# Patient Record
Sex: FEMALE | Race: WHITE | NOT HISPANIC OR LATINO
[De-identification: names, ages, dates, MRNs, and addresses within clinical notes are randomized per-mention and may not be internally consistent; named-entity substitution may affect disease eponyms.]

---

## 2018-08-29 ENCOUNTER — RECORDS - HEALTHEAST (OUTPATIENT)
Dept: ADMINISTRATIVE | Facility: OTHER | Age: 48
End: 2018-08-29

## 2018-08-29 ENCOUNTER — AMBULATORY - HEALTHEAST (OUTPATIENT)
Dept: SURGERY | Facility: CLINIC | Age: 48
End: 2018-08-29

## 2018-08-29 DIAGNOSIS — N60.91 ATYPICAL DUCTAL HYPERPLASIA OF RIGHT BREAST: ICD-10-CM

## 2018-09-04 ENCOUNTER — AMBULATORY - HEALTHEAST (OUTPATIENT)
Dept: SURGERY | Facility: CLINIC | Age: 48
End: 2018-09-04

## 2018-09-05 ENCOUNTER — HOSPITAL ENCOUNTER (OUTPATIENT)
Dept: SURGERY | Facility: CLINIC | Age: 48
Discharge: HOME | End: 2018-09-05
Attending: SURGERY

## 2018-09-05 DIAGNOSIS — N64.52 NIPPLE DISCHARGE: ICD-10-CM

## 2018-09-05 DIAGNOSIS — N60.91 ATYPICAL DUCTAL HYPERPLASIA OF RIGHT BREAST: ICD-10-CM

## 2018-09-05 ASSESSMENT — MIFFLIN-ST. JEOR: SCORE: 1139.58

## 2018-09-09 ENCOUNTER — HOSPITAL ENCOUNTER (OUTPATIENT)
Dept: MRI IMAGING | Facility: HOSPITAL | Age: 48
Discharge: HOME | End: 2018-09-09

## 2018-09-09 ENCOUNTER — COMMUNICATION - HEALTHEAST (OUTPATIENT)
Dept: TELEHEALTH | Facility: CLINIC | Age: 48
End: 2018-09-09

## 2018-09-09 DIAGNOSIS — N64.52 NIPPLE DISCHARGE: ICD-10-CM

## 2018-09-11 ENCOUNTER — RECORDS - HEALTHEAST (OUTPATIENT)
Dept: RADIOLOGY | Facility: CLINIC | Age: 48
End: 2018-09-11

## 2018-09-13 ENCOUNTER — COMMUNICATION - HEALTHEAST (OUTPATIENT)
Dept: SURGERY | Facility: CLINIC | Age: 48
End: 2018-09-13

## 2018-09-17 ENCOUNTER — COMMUNICATION - HEALTHEAST (OUTPATIENT)
Dept: SURGERY | Facility: CLINIC | Age: 48
End: 2018-09-17

## 2018-09-19 ENCOUNTER — HOSPITAL ENCOUNTER (OUTPATIENT)
Dept: SURGERY | Facility: CLINIC | Age: 48
Discharge: HOME | End: 2018-09-19
Attending: SURGERY

## 2018-09-19 DIAGNOSIS — N60.91 ATYPICAL DUCTAL HYPERPLASIA OF RIGHT BREAST: ICD-10-CM

## 2018-09-19 ASSESSMENT — MIFFLIN-ST. JEOR: SCORE: 1132.75

## 2018-09-24 ENCOUNTER — HOSPITAL ENCOUNTER (OUTPATIENT)
Dept: MAMMOGRAPHY | Facility: CLINIC | Age: 48
Discharge: HOME | End: 2018-09-24
Attending: SURGERY

## 2018-09-24 ENCOUNTER — ANESTHESIA - HEALTHEAST (OUTPATIENT)
Dept: SURGERY | Facility: AMBULATORY SURGERY CENTER | Age: 48
End: 2018-09-24

## 2018-09-24 ENCOUNTER — HOSPITAL ENCOUNTER (OUTPATIENT)
Dept: MAMMOGRAPHY | Facility: CLINIC | Age: 48
Discharge: HOME | End: 2018-09-24
Attending: SURGERY | Admitting: RADIOLOGY

## 2018-09-24 ENCOUNTER — SURGERY - HEALTHEAST (OUTPATIENT)
Dept: SURGERY | Facility: AMBULATORY SURGERY CENTER | Age: 48
End: 2018-09-24

## 2018-09-24 DIAGNOSIS — N64.89 BREAST ASYMMETRY: ICD-10-CM

## 2018-09-24 DIAGNOSIS — N60.91 ATYPICAL DUCTAL HYPERPLASIA OF RIGHT BREAST: ICD-10-CM

## 2018-09-24 ASSESSMENT — MIFFLIN-ST. JEOR: SCORE: 1131.64

## 2018-09-25 ENCOUNTER — COMMUNICATION - HEALTHEAST (OUTPATIENT)
Dept: SURGERY | Facility: CLINIC | Age: 48
End: 2018-09-25

## 2018-09-26 ENCOUNTER — COMMUNICATION - HEALTHEAST (OUTPATIENT)
Dept: SURGERY | Facility: CLINIC | Age: 48
End: 2018-09-26

## 2018-09-27 ENCOUNTER — COMMUNICATION - HEALTHEAST (OUTPATIENT)
Dept: SURGERY | Facility: CLINIC | Age: 48
End: 2018-09-27

## 2018-10-11 ENCOUNTER — RECORDS - HEALTHEAST (OUTPATIENT)
Dept: ADMINISTRATIVE | Facility: OTHER | Age: 48
End: 2018-10-11

## 2018-10-12 ENCOUNTER — COMMUNICATION - HEALTHEAST (OUTPATIENT)
Dept: SURGERY | Facility: CLINIC | Age: 48
End: 2018-10-12

## 2018-10-12 ENCOUNTER — AMBULATORY - HEALTHEAST (OUTPATIENT)
Dept: SURGERY | Facility: CLINIC | Age: 48
End: 2018-10-12

## 2018-10-12 DIAGNOSIS — D05.10 DCIS (DUCTAL CARCINOMA IN SITU): ICD-10-CM

## 2018-10-18 ENCOUNTER — COMMUNICATION - HEALTHEAST (OUTPATIENT)
Dept: SURGERY | Facility: CLINIC | Age: 48
End: 2018-10-18

## 2018-10-18 ASSESSMENT — MIFFLIN-ST. JEOR: SCORE: 1139.58

## 2018-10-22 ENCOUNTER — HOSPITAL ENCOUNTER (OUTPATIENT)
Dept: NUCLEAR MEDICINE | Facility: HOSPITAL | Age: 48
Discharge: HOME | End: 2018-10-22
Attending: SURGERY | Admitting: SURGERY

## 2018-10-22 ENCOUNTER — SURGERY - HEALTHEAST (OUTPATIENT)
Dept: SURGERY | Facility: HOSPITAL | Age: 48
End: 2018-10-22

## 2018-10-22 ENCOUNTER — HOSPITAL ENCOUNTER (OUTPATIENT)
Dept: NUCLEAR MEDICINE | Facility: HOSPITAL | Age: 48
Discharge: HOME | End: 2018-10-22
Attending: SURGERY

## 2018-10-22 ENCOUNTER — ANESTHESIA - HEALTHEAST (OUTPATIENT)
Dept: SURGERY | Facility: HOSPITAL | Age: 48
End: 2018-10-22

## 2018-10-22 DIAGNOSIS — D05.10 DCIS (DUCTAL CARCINOMA IN SITU): ICD-10-CM

## 2018-10-22 ASSESSMENT — MIFFLIN-ST. JEOR: SCORE: 1166.14

## 2018-10-23 ENCOUNTER — COMMUNICATION - HEALTHEAST (OUTPATIENT)
Dept: SURGERY | Facility: CLINIC | Age: 48
End: 2018-10-23

## 2018-10-24 ENCOUNTER — COMMUNICATION - HEALTHEAST (OUTPATIENT)
Dept: SURGERY | Facility: CLINIC | Age: 48
End: 2018-10-24

## 2018-10-25 ENCOUNTER — AMBULATORY - HEALTHEAST (OUTPATIENT)
Dept: SURGERY | Facility: CLINIC | Age: 48
End: 2018-10-25

## 2018-10-25 DIAGNOSIS — Z17.1 MALIGNANT NEOPLASM OF RIGHT BREAST IN FEMALE, ESTROGEN RECEPTOR NEGATIVE, UNSPECIFIED SITE OF BREAST (H): ICD-10-CM

## 2018-10-25 DIAGNOSIS — C50.911 MALIGNANT NEOPLASM OF RIGHT BREAST IN FEMALE, ESTROGEN RECEPTOR NEGATIVE, UNSPECIFIED SITE OF BREAST (H): ICD-10-CM

## 2018-11-30 ENCOUNTER — COMMUNICATION - HEALTHEAST (OUTPATIENT)
Dept: ADMINISTRATIVE | Facility: HOSPITAL | Age: 48
End: 2018-11-30

## 2018-12-13 ENCOUNTER — COMMUNICATION - HEALTHEAST (OUTPATIENT)
Dept: ONCOLOGY | Facility: HOSPITAL | Age: 48
End: 2018-12-13

## 2018-12-13 ENCOUNTER — RECORDS - HEALTHEAST (OUTPATIENT)
Dept: ADMINISTRATIVE | Facility: OTHER | Age: 48
End: 2018-12-13

## 2018-12-13 ENCOUNTER — OFFICE VISIT - HEALTHEAST (OUTPATIENT)
Dept: ONCOLOGY | Facility: CLINIC | Age: 48
End: 2018-12-13

## 2018-12-13 DIAGNOSIS — Z17.1 MALIGNANT NEOPLASM OF RIGHT BREAST IN FEMALE, ESTROGEN RECEPTOR NEGATIVE, UNSPECIFIED SITE OF BREAST (H): ICD-10-CM

## 2018-12-13 DIAGNOSIS — C50.911 MALIGNANT NEOPLASM OF RIGHT BREAST IN FEMALE, ESTROGEN RECEPTOR NEGATIVE, UNSPECIFIED SITE OF BREAST (H): ICD-10-CM

## 2018-12-13 DIAGNOSIS — Z80.0 FAMILY HISTORY OF COLON CANCER: ICD-10-CM

## 2018-12-13 DIAGNOSIS — Z71.83 ENCOUNTER FOR NONPROCREATIVE GENETIC COUNSELING: ICD-10-CM

## 2018-12-13 DIAGNOSIS — Z13.79 GENETIC TESTING: ICD-10-CM

## 2019-01-04 ENCOUNTER — COMMUNICATION - HEALTHEAST (OUTPATIENT)
Dept: ONCOLOGY | Facility: CLINIC | Age: 49
End: 2019-01-04

## 2019-01-09 ENCOUNTER — COMMUNICATION - HEALTHEAST (OUTPATIENT)
Dept: ONCOLOGY | Facility: HOSPITAL | Age: 49
End: 2019-01-09

## 2019-02-05 ENCOUNTER — AMBULATORY - HEALTHEAST (OUTPATIENT)
Dept: SURGERY | Facility: CLINIC | Age: 49
End: 2019-02-05

## 2021-06-02 VITALS — BODY MASS INDEX: 19.16 KG/M2 | HEIGHT: 65 IN | WEIGHT: 115 LBS

## 2021-06-02 VITALS — WEIGHT: 115 LBS | BODY MASS INDEX: 19.63 KG/M2 | HEIGHT: 64 IN

## 2021-06-02 VITALS — WEIGHT: 115 LBS | BODY MASS INDEX: 19.43 KG/M2

## 2021-06-02 VITALS — BODY MASS INDEX: 19.43 KG/M2 | WEIGHT: 115 LBS

## 2021-06-02 VITALS — HEIGHT: 64 IN | WEIGHT: 122 LBS | BODY MASS INDEX: 20.83 KG/M2

## 2021-06-20 NOTE — ANESTHESIA PREPROCEDURE EVALUATION
Anesthesia Evaluation      Patient summary reviewed   No history of anesthetic complications     Airway   Mallampati: II  Neck ROM: full   Pulmonary - negative ROS and normal exam    breath sounds clear to auscultation                         Cardiovascular - negative ROS and normal exam   Neuro/Psych - negative ROS     Endo/Other - negative ROS      GI/Hepatic/Renal - negative ROS      Other findings: Basal cell cancer      Dental - normal exam   (+) caps                       Anesthesia Plan  Planned anesthetic: MAC  FiO2 less than 30%  Propofol ggt  Decadron/zofran  ASA 1   Induction: intravenous   Anesthetic plan and risks discussed with: patient  Anesthesia plan special considerations: antiemetics,   Post-op plan: routine recovery        Results for orders placed or performed during the hospital encounter of 09/24/18   POCT Pregnancy (Beta-hCG, Qual), Urine (Point of Care) on DOS   Result Value Ref Range    POC Preg, Urine Negative Negative    POCt Kit Lot Number [id number]     POCT Kit Expiration Date 202001    Hemoglobin POCT (not required for cataract patients)   Result Value Ref Range    Hgb 11.6 7.0 g/dL

## 2021-06-20 NOTE — ANESTHESIA CARE TRANSFER NOTE
Last vitals:   Vitals:    09/24/18 1358   BP: (P) 118/57   Pulse: (P) 75   Resp: (P) 16   Temp: (P) 36.8  C (98.2  F)   SpO2: (P) 99%     Patient's level of consciousness is drowsy  Spontaneous respirations: yes  Maintains airway independently: yes  Dentition unchanged: yes  Oropharynx: oropharynx clear of all foreign objects    QCDR Measures:  ASA# 20 - Surgical Safety Checklist: WHO surgical safety checklist completed prior to induction  PQRS# 430 - Adult PONV Prevention: 4558F - Pt received => 2 anti-emetic agents (different classes) preop & intraop  ASA# 8 - Peds PONV Prevention: NA - Not pediatric patient, not GA or 2 or more risk factors NOT present  PQRS# 424 - Zuri-op Temp Management: 4559F - At least one body temp DOCUMENTED => 35.5C or 95.9F within required timeframe  PQRS# 426 - PACU Transfer Protocol: - Transfer of care checklist used  ASA# 14 - Acute Post-op Pain: ASA14B - Patient did NOT experience pain >= 7 out of 10

## 2021-06-20 NOTE — PROGRESS NOTES
HISTORY:  This is a 48 y.o. woman who I'm asked to see by aSgar Dial MD for evaluation of right breast atypical ductal hyperplasia.  This was diagnosed when the patient was living in [place] last month.  All of her studies were performed there.  This diagnosis actually came about when she complained of nipple discharge.  The discharge has been present for about 2 months.  It is clear and spontaneous.  It can be expressed with manipulation.  This led to mammography and ultrasound.  She underwent a needle biopsy which shows atypical ductal hyperplasia and a fibroadenoma.  She has no palpable mass that she can feel.  She even had a galactogram and cytology, which failed to show any abnormalities.    PAST MEDICAL HISTORY:  Past Medical History:   Diagnosis Date     Breast ductal hyperplasia, atypical        PAST SURGICAL HISTORY:  Past Surgical History:   Procedure Laterality Date     APPENDECTOMY       BASAL CELL CARCINOMA EXCISION      back and forehead     SINUS SURGERY      removed wisdom tooth remant and solved problem     MEDICATIONS  No current outpatient prescriptions on file.    ALLERGIES:  No Known Allergies    SOCIAL HISTORY:   reports that she has never smoked. She has never used smokeless tobacco.  Works for a pharmaceutical company.    FAMILY HISTORY:  She has no family history of breast cancer.    ROS:  General: No complaints or constitutional symptoms  Skin: No complaints or symptoms   Hematologic/Lymphatic: No symptoms or complaints  Psychiatric: No symptoms or complaints  Endocrine: No excessive fatigue, no hypermetabolic symptoms reported  Respiratory: No cough, shortness of breath, or wheezing  Cardiovascular: No chest pain or dyspnea on exertion  Breast: No palpable lesions.  There is clear right nipple discharge  Gastrointestinal: No abdominal pain, nausea, diarrhea, or constipation  Musculoskeletal: No recent injuries reported  Neurological: No focal neurologic defects reported.       PHYSICAL EXAM  /60 (Patient Site: Left Arm, Patient Position: Sitting, Cuff Size: Adult Regular)  Pulse 82  Wt 115 lb (52.2 kg)  SpO2 99%  Breastfeeding? No  BMI 19.43 kg/m2  Body mass index is 19.43 kg/(m^2).  General: Alert, cooperative, appears stated age   Skin: Skin color, texture, turgor normal, no rashes or lesions   Lymphatic: No obvious adenopathy, no swelling   Eyes: No scleral icterus, pupils equal  HENT: No traumatic injury to the head or face, no gross abnormalities  Lungs: Normal respiratory effort, breath sounds equal bilaterally  Heart: Regular rate and rhythm  Breasts: No skin dimpling, nipple discharge, palpable mass or adenopathy.  There is clear nipple discharge.  Abdomen: Soft, non-distended and non-tender to palpation  Neurologic: Grossly intact    IMAGING:   Her mammogram showed disseminated groups of microcalcifications.  Her workup also demonstrated duct ectasia and a small cystic lesion.    PATHOLOGY:  Right breast 7:00 duct ectasia and atypical ductal hyperplasia, but the quality of DCIS was not reached.  Right breast 10 o'clock position fibroadenoma    IMPRESSION:   Right breast nipple discharge  Right breast duct ectasia and atypical ductal hyperplasia    PLAN:   Prior to the patient's move, she was informed that the next step of her workup should be MRI.  I think that would be reasonable to continue with to work up her nipple discharge to make sure there is not an occult mass.  Once the MRI is performed and any further results from that are complete, the patient will return to the clinic to discuss the results and plan future surgery.  She understands that further excision is indicated to rule out DCIS or invasive carcinoma.  This would be done under wire localization.  It was confirmed that clips were left in place in the prior biopsy sites.  In regard to the nipple discharge, a subareolar duct excision could also be performed.  Both of these are typically an outpatient  procedure under local MAC anesthetic.      We will await the MRI results and discuss future plans after that.    Jenny Galdamez, [facility]  [phone]

## 2021-06-20 NOTE — PROGRESS NOTES
HISTORY:  This is a 48 y.o. woman who represents to discuss MRI and potentially plan surgery.  She had imaging studies performed in [place] for right nipple discharge.  She had undergone 2 biopsies that demonstrated a fibroadenoma, atypical ductal hyperplasia, and duct ectasia.  She continues to have right nipple discharge and wants to have duct excision as well as the ADH excised.    PHYSICAL EXAM  Deferred    IMAGING:   MR BREAST WITHOUT AND WITH CONTRAST BILATERAL  09/09/2018 1:21 PM     INDICATION: High risk screening. Right nipple discharge. The patient has a history of atypical ductal hyperplasia and fibroadenoma which was diagnosed when she was living in [place] approximately one month ago.  TECHNIQUE: 3D high-resolution images, vascular subtraction images with 7 mL Gadavist and MIP images performed. The study was processed using a [employer] computer-aided detection system to optimize radiologist interpretation by generating multiplanar and   3D reconstructions, creating subtraction images from the dynamic contrast data, and computing tumor volumes and dimensions.  COMPARISON: We are attempting to obtain the patient's prior exams from [place]. So far, we have not been able to obtain them.     RIGHT BREAST: There is moderate background enhancement. There are innumerable circumscribed lesions which are high signal on the STIR images and low signal on T1-weighted images consistent with benign cysts. A marker clip from a prior biopsy is seen in   the posterior aspect of the upper outer quadrant in approximately the 11:00-11:30 position in zone 3. There appears to be a moderately dilated duct in the 9:30 position in the anterior tissues containing proteinaceous fluid. No definite intraductal   lesion is identified. There are no findings to suggest malignancy. The axilla is unremarkable. MIP images confirm these findings.     LEFT BREAST: There is moderate background enhancement. There are innumerable circumscribed  lesions which are high signal on the STIR images and low signal on T1-weighted images consistent with benign cysts. There are no findings to suggest malignancy.   The axilla is unremarkable. MIP images confirm these findings.     IMPRESSION:   1.  Mildly dilated duct in the right breast in approximately the 9:30 position. No definite intraductal lesion is identified.     2.  Extensive benign cysts in both breasts.     3.  Marker clip in the right breast. Correlation with the patient's biopsy history is recommended.     4.  The patient's prior exams have not been obtained so far. If they can be obtained, a comparison will be made and an addendum dictated.     RIGHT BREAST: ACR BI-RADS Category 2: Benign.  LEFT BREAST: ACR BI-RADS Category 2: Benign.    ADDENDUM:  Prior mammogram, breast ultrasound, and ductogram of the right breast from 8/7/2018 are now available for comparison. The images do not provide information that is helpful with the interpretation of the MRI. Multiple benign cysts are seen on the   ultrasound and circumscribed lesions in the breasts on mammography correlate with benign cysts seen on the MRI.    PATHOLOGY:  Right breast 7 o'clock duct ectasia and atypical ductal hyperplasia, but the quality of DCIS was not reached.  Right breast 10 o'clock position fibroadenoma    IMPRESSION:   Right nipple discharge  Duct ectasia and atypical ductal hyperplasia    PLAN:   The patient already understands that further excision is indicated to rule out DCIS or invasive carcinoma.  She would also like for the duct next to this area to be excised due to the nipple discharge that she is having.  This case was extensively discussed with radiology before and during this appointment.  Her MRI revealed one biopsy clip where the fibroadenoma had been.  The other clip in the retroareolar position was not identified.  Therefore, it was explained to the patient that in order to perform wire localized lumpectomy of the area  of atypical ductal hyperplasia, we would have to obtain a mammogram to ensure that there is a clip in place.  The patient stated that she would not come back to this building again unless we were performing surgery.  Therefore, the plan will be for right breast mammography followed by wire localization and then excisional breast biopsy.  This is typically an outpatient procedure under local MAC anesthetic.  The risks and benefits of surgery explained, as well as the expected post-operative recovery.  All questions answered.  The patient would like to proceed, and she will schedule at her earliest convenience.    Jenny Galdamez, DO  [facility]  [phone]

## 2021-06-20 NOTE — ANESTHESIA POSTPROCEDURE EVALUATION
Patient: Beth Oropeza  RIGHT WIRE LOCALIZED LUMPECTOMY  Anesthesia type: MAC    Patient location: Phase II Recovery  Last vitals:   Vitals:    09/24/18 1500   BP: 117/54   Pulse: 82   Resp:    Temp:    SpO2: 99%     Post vital signs: stable  Level of consciousness: awake and responds to simple questions  Post-anesthesia pain: pain controlled  Post-anesthesia nausea and vomiting: no  Pulmonary: unassisted, return to baseline  Cardiovascular: stable and blood pressure at baseline  Hydration: adequate  Anesthetic events: no    QCDR Measures:  ASA# 11 - Zuri-op Cardiac Arrest: ASA11B - Patient did NOT experience unanticipated cardiac arrest  ASA# 12 - Zuri-op Mortality Rate: ASA12B - Patient did NOT die  ASA# 13 - PACU Re-Intubation Rate: ASA13B - Patient did NOT require a new airway mgmt  ASA# 10 - Composite Anes Safety: ASA10A - No serious adverse event    Additional Notes:

## 2021-06-20 NOTE — PROGRESS NOTES
Chief Complaint   Patient presents with     Consult     patient in for consult on right breast questions and concerns

## 2021-06-21 NOTE — ANESTHESIA PREPROCEDURE EVALUATION
Anesthesia Evaluation      Patient summary reviewed   No history of anesthetic complications     Airway   Mallampati: II  Neck ROM: full   Pulmonary - negative ROS and normal exam    breath sounds clear to auscultation                         Cardiovascular - negative ROS  Rhythm: regular  Rate: normal,         Neuro/Psych - negative ROS     Endo/Other       Comments: Breast cancer    GI/Hepatic/Renal - negative ROS           Dental - normal exam                        Anesthesia Plan  Planned anesthetic: general endotracheal  Propofol infusion  Ketamine 50 mg pre-incision  Titrate dilaudid toward end of case    ASA 2   Induction: intravenous   Anesthetic plan and risks discussed with: patient and spouse  Anesthesia plan special considerations: antiemetics,   Post-op plan: routine recovery

## 2021-06-21 NOTE — ANESTHESIA POSTPROCEDURE EVALUATION
Patient: Beth Oropeza  BILATERAL MASTECTOMIES , WITH RIGHT SENTINEL LYMPH NODE BIOPSY, BILATERAL BREAST RECONSTRUCTION WITH TISSUE EXPANDERS PLACEMENT  Anesthesia type: general    Patient location: PACU  Last vitals:   Vitals:    10/22/18 1839   BP:    Pulse:    Resp:    Temp: (P) 37.3  C (99.2  F)   SpO2:      Post vital signs: stable  Level of consciousness: awake and responds to simple questions  Post-anesthesia pain: pain controlled  Post-anesthesia nausea and vomiting: no  Pulmonary: unassisted, return to baseline  Cardiovascular: stable and blood pressure at baseline  Hydration: adequate  Anesthetic events: no    QCDR Measures:  ASA# 11 - Zuri-op Cardiac Arrest: ASA11B - Patient did NOT experience unanticipated cardiac arrest  ASA# 12 - Zuri-op Mortality Rate: ASA12B - Patient did NOT die  ASA# 13 - PACU Re-Intubation Rate: ASA13B - Patient did NOT require a new airway mgmt  ASA# 10 - Composite Anes Safety: ASA10A - No serious adverse event    Additional Notes:

## 2021-06-21 NOTE — ANESTHESIA CARE TRANSFER NOTE
Last vitals:   Vitals:    10/22/18 1748   BP: (P) 120/60   Pulse:    Resp: (P) 16   Temp: (P) 36.7  C (98  F)   SpO2:    pulse 87 sats 100    Pt brought to PACU on 10L facemask. Monitors applied. VSS upon arrival.    Patient's level of consciousness is drowsy  Spontaneous respirations: yes  Maintains airway independently: yes  Dentition unchanged: yes  Oropharynx: oropharynx clear of all foreign objects    QCDR Measures:  ASA# 20 - Surgical Safety Checklist: WHO surgical safety checklist completed prior to induction  PQRS# 430 - Adult PONV Prevention: 4558F - Pt received => 2 anti-emetic agents (different classes) preop & intraop  ASA# 8 - Peds PONV Prevention: NA - Not pediatric patient, not GA or 2 or more risk factors NOT present  PQRS# 424 - Zuri-op Temp Management: 4559F - At least one body temp DOCUMENTED => 35.5C or 95.9F within required timeframe  PQRS# 426 - PACU Transfer Protocol: - Transfer of care checklist used  ASA# 14 - Acute Post-op Pain: ASA14B - Patient did NOT experience pain >= 7 out of 10

## 2021-06-22 NOTE — TELEPHONE ENCOUNTER
After review by Dr Alcala, call placed to Beth in the absence of Payton Goodman, genetic counselor.  I let her know that her genetic testing results are back and it is negative.  There is no pathologic sequence variants or deletions/duplications identified.  I let her know that her test result was negative and this test did not provide an explanation for your medical or family history.  She was happy to hear this.  I let her know that I will send her a copy in the mail as well as put one in her medical chart.      Jenny Fisher RN

## 2021-06-22 NOTE — PROGRESS NOTES
[facility] GENETIC COUNSELING: CONSULT SUMMARY  Patient: Beth Oropeza (1970 / 48 y.o., female) MRN: [id number]   [address] [facility] [address]      Date/Location of Visit: 12/13/2018, [facility]  Care Provider: Payton Morales MS, [facility] ([phone], [email or web address]  Referring Provider: Jenny Galdamez DO  Present: Beth     PRESENTING CONCERN: early-onset breast cancer    DIAGNOSIS: Beth was recently diagnosed with breast cancer at age 48.  She initially presented with right nipple discharge while living in [place] and had biopsy demonstrating atypical ductal hyperplasia, fibroadenoma, and duct ectasia.  [place] has re[place]located to the [place] for her 's work and met with Dr. Galdamez initially on 9/5/2018.  MRI was recommended and did not demonstrate any other abnormalities.  Beth underwent excisional biopsy on 9/24/2018 with Dr. 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  Beth had bilateral mastectomies on 10/22/2018 with additional grade 3 DCIS, ER CT negative in the right breast.  Left breast was negative.      Other cancer screening: Beth also has a history of basal cell carcinomas excised from her forehead x2 and back.  She was following with dermatology every 3 months in [facility].  She has had 2 colonoscopies to evaluate anemia, most recently 3 years ago.  No history of polyps.    Hormone/reproductive: Uterus and ovaries remain in place.  Beth has 2 sons, ages 11 and 14.    Exposures: No history of smoking.    Prior genetic testing: None.    No prior transplant or recent transfusion.    FAMILY HISTORY: See pedigree. No relatives have had genetic testing previously that Beth is aware.    The importance of accurate and verified history was emphasized.  In evaluating Beth's personal and family history we discussed differences between  sporadic, environmental, and genetic contributions to cancer risk including relevant genetic principals and inheritance patterns.     ASSESSMENT:  Provided assessment of Beth's personal/family history of cancer as it pertains to current standards for genetic testing:      Beth's diagnosis of triple-negative breast cancer at age 48 in addition to a limited paternal family structure (i.e., both of her sisters are younger and she has no paternal aunts) meets current National Comprehensive Cancer Network (NCCN) guidelines for hereditary cancer testing including BRCA1/2    Multiple other genes can underlie risk for breast cancer and cannot be distinguished by review of the history alone since they often have overlapping presentations (CHEK2, MAGO, PALB2, etc); it has been recently suggested that genes like BRIP1, RAD51C, and RAD51D could be linked with triple-negative breast cancer as well    Overall, approximately 12-13% of women diagnosed with triple negative breast cancer between ages 40-49 and without family history of breast/ovarian cancers have been found to have a pathogenic variant on breast cancer gene panels (8-9% in BRCA1/2)   Beth's family history of colon cancer in a paternal uncle at age 47 also meets current NCCN guidelines for genetic testing including assessment for Tomlinson syndrome; about 15-20% of those diagnosed with colorectal cancer under age 50 have a pathogenic variant on broad panels (8-13% in Tomlinson genes)  Prostate cancers in Beth's father and paternal uncle do not appear to have been particularly aggressive but can occur within the spectrum of homologous recombination or mismatch repair genes as well  There is also a clustering of lung cancer in Beth's maternal uncle and both maternal grandparents but all had notable first- or second-hand smoke exposure    Testing is offered in the context of a multi-gene panel to simultaneously analyze all known genes that could be linked to  Beth's personal/family history of cancer and to most accurately inform future risks and appropriate medical management for her.      USE OF RESULTS:  We reviewed likelihood of possible results of genetic testing: positive, negative, and a variant of uncertain significance along with medical and social/psychological considerations. We reviewed elevated cancer risks associated with hereditary predisposition and options for prevention and/or heightened surveillance if positive.   This includes elevated risk for other types of cancer (e.g., ovarian or colorectal cancer) depending on the underlying gene.    For Beth information from genetic testing may determine:      Additional cancer surveillance for which Beth may qualify (i.e., more frequent colonoscopy with Tomlinson syndrome)  Options for risk-reducing surgeries Beth could consider (i.e., bilateral salpingo-oophorectomy with BRCA1/2, also hysterectomy with Tomlinson syndrome)  Use of risk-reducing medications (i.e., NSAIDs with Tomlinson syndrome)    With panel testing results may identify risks across a spectrum of cancer types and of varying magnitude including those that would be unexpected in the context of Beth's personal/family history.  Use of results may be limited in the case of ambiguous findings, findings in poorly characterized genes, or involving cancers for which prevention or surveillance is not possible.  Risks and recommendations associated with both new and established genes may evolve over time.      Limitations of a negative result were emphasized including possibility of risk being linked to one or more other genes or variants not detected by current methodologies.  In this case Beth would continue any recommended follow-up in regards to her breast and skin cancers.  She could start regular colonoscopy at any time (different guidelines recommend between 45-50) and this would be at least every 5-10 years considering her uncle's  early-onset disease.  She would of course be encouraged continued smoking avoidance.    FAMILY:  Reviewed autosomal dominant inheritance and importance of sharing positive results with relatives.  Testing is generally limited to adults although exceptions apply to genes associated with childhood cancer risks or rare childhood conditions.    Negative results for Beth will not necessarily equate to average breast cancer risk for her relatives and individual assessment would be encouraged to ensure adequate surveillance in the context of the family history.  Specifically, Beth's 2 younger sisters may still qualify for heightened breast surveillance with annual mammograms and MRIs.    Beth's uncle with early-onset colorectal cancer is a valuable candidate for additional genetic testing in the family.  If he does not complete this then genetic testing would remain available to Beth's sisters and other paternal relatives.    PSYCHOSOCIAL/DECISION-MAKING:   Beth expressed no hesitations or concerns as it pertains to genetic testing or her ability to cope with results.  Beth has had multiple breast surgeries and admits it has been a difficult experience.  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  She grew up in [place] and had been living in [place] now with her .  [place] the [place] for [place] 's job for 1 year and then they will return to [place].  She is looking forward to going back.  She will also be visiting [place] for the holiday between December 20 and January 2.    OTHER: Collection or assessment of other aspects of the family history not related to cancer risk were beyond the scope of our discussion today.    PLAN: Beth opted to pursue genetic testing for hereditary cancer today.      Test: 47-gene Common Hereditary Cancers Panel (BRCA1, BRCA2, PTEN, TP53, CDH1, CTNNA1, MLH1, MSH2, MSH6, PMS2, EPCAM, STK11, NF1, MUTYH, APC, POLD, POLE,  BMPR1A, SMAD4, GREM1, CDKN2A, CDK4, MEN1, TSC1, TSC2, VHL, SDHA, SDHB, SDHC, SDHD, SMARCA4, MAGO, BARD1, BRIP1, CHEK2, NBN, PALB2, RAD50, RAD51C,RAD51D, DICER1, PDGFRA, KIT, AXIN2, HOXB13, MSH3, NTHL1)    Consent: verbal and written    Sample: Beth requested to provide a saliva rather than blood sample for genetic testing and that was collected with Oragene kit today    Laboratory: [facility]    Results: expected within 3 weeks; since this is likely to be while Beth is on holiday in [place] she opts to be contacted via her [employer] account and then would be available by phone again when she returns in the new year    We discussed my end date with [facility] of 12/19/2018 and after that time a nurse or physician on staff would call with results and they will also be reviewed by the [facility] genetic counselors.  If needed, follow-up with [facility]'s [facility] Risk [facility] ([facility]) would be arranged. Patient can contact nurse triage at [phone] with questions after 12/19/2018.    Beth should contact our office at least annually for updates or with changes to personal/family history as the clinic does not routinely re-contact individual patients with an increase or change in knowledge.      Handouts or Enclosures:  contact information, After Visit Summary, pedigree    Face-to-face time: 55 minutes    cc:  DO Beth Carrillo (patient)

## 2021-06-23 NOTE — PROGRESS NOTES
Beth had surgery on 10/22/2019 with Dr. Galdamez and Dr. Gotti. Danielito requested additional records to support the claim. I faxed records today to [employer].    Fax #: [phone]  Date of Claim Receipt: 12/17/2018  Claim Number: [id number]  Reference # [id number]  Records attached: OP report for lumpectomy on 9/24/2018, post-op notes, MRI 9/5/19, Path report 9/24/18 and 10/22/18.    Marie Baum [facility]  Surgery Scheduling  [phone]

## 2021-06-27 ENCOUNTER — HEALTH MAINTENANCE LETTER (OUTPATIENT)
Age: 51
End: 2021-06-27

## 2021-10-17 ENCOUNTER — HEALTH MAINTENANCE LETTER (OUTPATIENT)
Age: 51
End: 2021-10-17

## 2022-07-23 ENCOUNTER — HEALTH MAINTENANCE LETTER (OUTPATIENT)
Age: 52
End: 2022-07-23

## 2022-10-01 ENCOUNTER — HEALTH MAINTENANCE LETTER (OUTPATIENT)
Age: 52
End: 2022-10-01

## 2023-08-12 ENCOUNTER — HEALTH MAINTENANCE LETTER (OUTPATIENT)
Age: 53
End: 2023-08-12